# Patient Record
Sex: FEMALE | Race: WHITE | NOT HISPANIC OR LATINO | Employment: OTHER | URBAN - METROPOLITAN AREA
[De-identification: names, ages, dates, MRNs, and addresses within clinical notes are randomized per-mention and may not be internally consistent; named-entity substitution may affect disease eponyms.]

---

## 2018-06-30 ENCOUNTER — HOSPITAL ENCOUNTER (EMERGENCY)
Facility: HOSPITAL | Age: 73
Discharge: HOME | End: 2018-07-01
Attending: EMERGENCY MEDICINE
Payer: MEDICARE

## 2018-06-30 DIAGNOSIS — K62.3 RECTAL PROLAPSE: Primary | ICD-10-CM

## 2018-06-30 PROCEDURE — 99282 EMERGENCY DEPT VISIT SF MDM: CPT

## 2018-07-01 VITALS
HEART RATE: 75 BPM | WEIGHT: 97 LBS | HEIGHT: 62 IN | TEMPERATURE: 99 F | OXYGEN SATURATION: 100 % | SYSTOLIC BLOOD PRESSURE: 196 MMHG | DIASTOLIC BLOOD PRESSURE: 95 MMHG | BODY MASS INDEX: 17.85 KG/M2 | RESPIRATION RATE: 29 BRPM

## 2018-07-01 NOTE — ED PROVIDER NOTES
SCRIBE #1 NOTE: I, Marty Kee, am scribing for, and in the presence of, Jorge Hardy MD. I have scribed the entire note.      History      Chief Complaint   Patient presents with    Rectal Pain     pt reports rectal pain after bowel movement       Review of patient's allergies indicates:  No Known Allergies     HPI   HPI    7/1/2018, 1:39 AM   History obtained from the patient      History of Present Illness: Jolly Gleason is a 72 y.o. female patient who presents to the Emergency Department for an evaluation of rectal pain. Pt reports while using the restroom she strained herself until she suddenly felt a small mass on her bottom. Pt states she was able to push it back in but decided to come in for further evaluation. Symptoms are resolved. Exacerbated by nothing and relieved by nothing. No associated sxs. Patient denies any fever, chills, abd pain, N/V, rectal pain/bleeding, dysuria, hematuria, and all other sxs at this time. No further complaints or concerns at this time.     Arrival mode: Personal vehicle    PCP: Jyotsna St DNP       Past Medical History:  Past Medical History:   Diagnosis Date    Anxiety     Hypertension     Migraine headache     Stroke        Past Surgical History:  Past Surgical History:   Procedure Laterality Date    HYSTERECTOMY      MANDIBLE SURGERY      SINUS SURGERY      TONSILLECTOMY           Family History:  Family History   Problem Relation Age of Onset    Diabetes Mother     Heart disease Mother     Hypertension Mother     Stroke Mother     Cancer Father     Diabetes Daughter     Cancer Maternal Aunt     Diabetes Maternal Grandmother     Hypertension Maternal Grandmother        Social History:  Social History     Social History Main Topics    Smoking status: Current Every Day Smoker    Smokeless tobacco: Not on file    Alcohol use No    Drug use: No    Sexual activity: Not on file       ROS   Review of Systems   Constitutional: Negative for  chills and fever.   HENT: Negative for congestion and sore throat.    Respiratory: Negative for cough and shortness of breath.    Cardiovascular: Negative for chest pain.   Gastrointestinal: Positive for rectal pain (Resolved). Negative for abdominal pain, anal bleeding, blood in stool, constipation, diarrhea, nausea and vomiting.   Genitourinary: Negative for dysuria and hematuria.   Musculoskeletal: Negative for back pain.   Skin: Negative for rash.   Neurological: Negative for dizziness, weakness, light-headedness and headaches.   Hematological: Does not bruise/bleed easily.     Physical Exam      Initial Vitals [06/30/18 2222]   BP Pulse Resp Temp SpO2   (!) 150/84 94 20 98.7 °F (37.1 °C) 95 %      MAP       --          Physical Exam  Nursing Notes and Vital Signs Reviewed.  Constitutional: Patient is in no acute distress. Well-developed and well-nourished.  Head: Atraumatic. Normocephalic.  Eyes: PERRL. EOM intact. Conjunctivae are not pale. No scleral icterus.  ENT: Mucous membranes are moist. Oropharynx is clear and symmetric.    Neck: Supple. Full ROM. No lymphadenopathy.  Cardiovascular: Regular rate. Regular rhythm. No murmurs, rubs, or gallops. Distal pulses are 2+ and symmetric.  Pulmonary/Chest: No respiratory distress. Clear to auscultation bilaterally. No wheezing or rales.  Abdominal: Soft and non-distended.  There is no tenderness.  No rebound, guarding, or rigidity. Good bowel sounds.  Genitourinary: No CVA tenderness  Rectal:  No tenderness.  No masses. A few hemorrhoids.  Normal sphincter tone.    Musculoskeletal: Moves all extremities. No obvious deformities. No edema. No calf tenderness.  Skin: Warm and dry.  Neurological:  Alert, awake, and appropriate.  Normal speech.  No acute focal neurological deficits are appreciated.  Psychiatric: Normal affect. Good eye contact. Appropriate in content.    ED Course    Procedures  ED Vital Signs:  Vitals:    06/30/18 2222 07/01/18 0131   BP: (!) 150/84  "(!) 196/95   Pulse: 94 75   Resp: 20 (!) 29   Temp: 98.7 °F (37.1 °C)    TempSrc: Oral    SpO2: 95% 100%   Weight: 44 kg (97 lb)    Height: 5' 2" (1.575 m)               The Emergency Provider reviewed the vital signs and test results, which are outlined above.    ED Discussion     1:39 AM: Reassessed pt at this time. Rectal exam is unremarkable.  Sx's likely secondary to a prolapsed rectum.  Pt counseled that if it occurs again she is to attempt to reduce it back in place with gentle pressure and if she is unable to do so then return to the nearest emergency room.  Pt also counseled to F/U with GI outpatient as well.  Discussed pt dx and plan of tx. Gave pt all f/u and return to the ED instructions. All questions and concerns were addressed at this time. Pt expresses understanding of information and instructions, and is comfortable with plan to discharge. Pt is stable for discharge.    I discussed with patient and/or family/caretaker that evaluation in the ED does not suggest any emergent or life threatening medical conditions requiring immediate intervention beyond what was provided in the ED, and I believe patient is safe for discharge.  Regardless, an unremarkable evaluation in the ED does not preclude the development or presence of a serious of life threatening condition. As such, patient was instructed to return immediately for any worsening or change in current symptoms.      ED Medication(s):  Medications - No data to display    Discharge Medication List as of 7/1/2018  1:40 AM          Follow-up Information     Jyotsna St DNP. Schedule an appointment as soon as possible for a visit in 3 days.    Specialty:  Family Medicine  Why:  to follow-up on today's visit and seek referral to general surgery if the rectal prolapse becomes recurrent  Contact information:  [address] [facility]  [place] [id number] [phone]             Go to  [facility] .    Specialty:  Emergency Medicine  Why:  As needed, If symptoms worsen  Contact information:  [address] [facility] [address]  [facility] [address] [phone]                   Medical Decision Making              Scribe Attestation:   Scribe #1: I performed the above scribed service and the documentation accurately describes the services I performed. I attest to the accuracy of the note.    Attending:   Physician Attestation Statement for Scribe #1: I, Jorge Hardy MD, personally performed the services described in this documentation, as scribed by Marty Kee, in my presence, and it is both accurate and complete.          Clinical Impression       ICD-10-CM ICD-9-CM   1. Rectal prolapse K62.3 569.1       Disposition:   Disposition: Discharged  Condition: Stable         Jorge Hardy MD  07/04/18 1640

## 2018-07-03 ENCOUNTER — HOSPITAL ENCOUNTER (EMERGENCY)
Facility: HOSPITAL | Age: 73
Discharge: HOME | End: 2018-07-04
Attending: EMERGENCY MEDICINE
Payer: MEDICARE

## 2018-07-03 DIAGNOSIS — R10.9 ABDOMINAL PAIN, UNSPECIFIED ABDOMINAL LOCATION: Primary | ICD-10-CM

## 2018-07-03 LAB
ALBUMIN SERPL BCP-MCNC: 3.6 G/DL
ALP SERPL-CCNC: 56 U/L
ALT SERPL W/O P-5'-P-CCNC: 8 U/L
ANION GAP SERPL CALC-SCNC: 9 MMOL/L
AST SERPL-CCNC: 18 U/L
BASOPHILS # BLD AUTO: 0.01 K/UL
BASOPHILS NFR BLD: 0.2 %
BILIRUB SERPL-MCNC: 0.3 MG/DL
BUN SERPL-MCNC: 14 MG/DL
CALCIUM SERPL-MCNC: 8.8 MG/DL
CHLORIDE SERPL-SCNC: 103 MMOL/L
CO2 SERPL-SCNC: 22 MMOL/L
CREAT SERPL-MCNC: 1 MG/DL
DIFFERENTIAL METHOD: ABNORMAL
EOSINOPHIL # BLD AUTO: 0.1 K/UL
EOSINOPHIL NFR BLD: 2 %
ERYTHROCYTE [DISTWIDTH] IN BLOOD BY AUTOMATED COUNT: 13.6 %
EST. GFR  (AFRICAN AMERICAN): >60 ML/MIN/1.73 M^2
EST. GFR  (NON AFRICAN AMERICAN): 57 ML/MIN/1.73 M^2
GLUCOSE SERPL-MCNC: 97 MG/DL
HCT VFR BLD AUTO: 35.7 %
HGB BLD-MCNC: 12.3 G/DL
LIPASE SERPL-CCNC: 11 U/L
LYMPHOCYTES # BLD AUTO: 2 K/UL
LYMPHOCYTES NFR BLD: 30.3 %
MCH RBC QN AUTO: 33.6 PG
MCHC RBC AUTO-ENTMCNC: 34.5 G/DL
MCV RBC AUTO: 98 FL
MONOCYTES # BLD AUTO: 0.6 K/UL
MONOCYTES NFR BLD: 8.4 %
NEUTROPHILS # BLD AUTO: 3.9 K/UL
NEUTROPHILS NFR BLD: 59.1 %
PLATELET # BLD AUTO: 167 K/UL
PMV BLD AUTO: 9.4 FL
POTASSIUM SERPL-SCNC: 4.2 MMOL/L
PROT SERPL-MCNC: 5.8 G/DL
RBC # BLD AUTO: 3.66 M/UL
SODIUM SERPL-SCNC: 134 MMOL/L
WBC # BLD AUTO: 6.51 K/UL

## 2018-07-03 PROCEDURE — 80053 COMPREHEN METABOLIC PANEL: CPT

## 2018-07-03 PROCEDURE — 99284 EMERGENCY DEPT VISIT MOD MDM: CPT | Mod: 25

## 2018-07-03 PROCEDURE — 85025 COMPLETE CBC W/AUTO DIFF WBC: CPT

## 2018-07-03 PROCEDURE — 25500020 PHARM REV CODE 255: Performed by: EMERGENCY MEDICINE

## 2018-07-03 PROCEDURE — 83690 ASSAY OF LIPASE: CPT

## 2018-07-03 PROCEDURE — 96374 THER/PROPH/DIAG INJ IV PUSH: CPT

## 2018-07-03 RX ORDER — ACETAMINOPHEN 10 MG/ML
15 INJECTION, SOLUTION INTRAVENOUS ONCE
Status: COMPLETED | OUTPATIENT
Start: 2018-07-04 | End: 2018-07-04

## 2018-07-03 RX ADMIN — IOHEXOL 30 ML: 350 INJECTION, SOLUTION INTRAVENOUS at 11:07

## 2018-07-04 VITALS
TEMPERATURE: 99 F | HEIGHT: 62 IN | OXYGEN SATURATION: 95 % | WEIGHT: 96.13 LBS | RESPIRATION RATE: 18 BRPM | BODY MASS INDEX: 17.69 KG/M2 | HEART RATE: 69 BPM | SYSTOLIC BLOOD PRESSURE: 138 MMHG | DIASTOLIC BLOOD PRESSURE: 67 MMHG

## 2018-07-04 LAB
BILIRUB UR QL STRIP: NEGATIVE
CLARITY UR: CLEAR
COLOR UR: YELLOW
GLUCOSE UR QL STRIP: NEGATIVE
HGB UR QL STRIP: NEGATIVE
KETONES UR QL STRIP: NEGATIVE
LEUKOCYTE ESTERASE UR QL STRIP: NEGATIVE
NITRITE UR QL STRIP: NEGATIVE
PH UR STRIP: 7 [PH] (ref 5–8)
PROT UR QL STRIP: NEGATIVE
SP GR UR STRIP: 1.01 (ref 1–1.03)
URN SPEC COLLECT METH UR: NORMAL
UROBILINOGEN UR STRIP-ACNC: NEGATIVE EU/DL

## 2018-07-04 PROCEDURE — 63600175 PHARM REV CODE 636 W HCPCS: Performed by: EMERGENCY MEDICINE

## 2018-07-04 PROCEDURE — 81003 URINALYSIS AUTO W/O SCOPE: CPT

## 2018-07-04 RX ADMIN — ACETAMINOPHEN 660 MG: 10 INJECTION, SOLUTION INTRAVENOUS at 12:07

## 2018-07-04 NOTE — ED PROVIDER NOTES
SCRIBE #1 NOTE: I, Marty Kee, am scribing for, and in the presence of, Javier Mahmood MD. I have scribed the entire note.      History      Chief Complaint   Patient presents with    Pelvic Pain     c/o pelvic pain and swelling to lower abdomen. Pt recently seen here for rectal prolaps       Review of patient's allergies indicates:   Allergen Reactions    Lyrica [pregabalin]      syncope        HPI   HPI    7/3/2018, 10:44 PM   History obtained from the patient      History of Present Illness: Jolly Gleason is a 72 y.o. female patient who presents to the Emergency Department for an evaluation of pelvic pain which onset gradually a few days ago. Pt was reportedly seen in this ED for an evaluation of a prolapsed rectum x2 days ago. Symptoms are constant and moderate in severity. Exacerbated by palpation and relieved by nothing. Associated sxs include abdominal distention. Patient denies any fever, chills, N/V/D, vaginal bleeding/ discharge, HA, weakness/numbness, dizziness/lightheadedness, and all other sxs at this time. Pt denies tx PTA. No further complaints or concerns at this time.         Arrival mode: Personal vehicle    PCP: Jyotsna St DNP       Past Medical History:  Past Medical History:   Diagnosis Date    Anxiety     Hypertension     Migraine headache     Stroke        Past Surgical History:  Past Surgical History:   Procedure Laterality Date    HYSTERECTOMY      MANDIBLE SURGERY      SINUS SURGERY      TONSILLECTOMY           Family History:  Family History   Problem Relation Age of Onset    Diabetes Mother     Heart disease Mother     Hypertension Mother     Stroke Mother     Cancer Father     Diabetes Daughter     Cancer Maternal Aunt     Diabetes Maternal Grandmother     Hypertension Maternal Grandmother        Social History:  Social History     Social History Main Topics    Smoking status: Current Every Day Smoker    Smokeless tobacco: Unknown    Alcohol use No     Drug use: No    Sexual activity: Unknown       ROS   Review of Systems   Constitutional: Negative for appetite change, chills and fever.   HENT: Negative for congestion, sore throat and trouble swallowing.    Respiratory: Negative for cough and shortness of breath.    Cardiovascular: Negative for chest pain and leg swelling.   Gastrointestinal: Positive for abdominal distention and abdominal pain (Lower abdomen). Negative for blood in stool, constipation, diarrhea, nausea and vomiting.   Genitourinary: Positive for pelvic pain. Negative for dysuria, frequency, hematuria, urgency, vaginal bleeding and vaginal discharge.   Musculoskeletal: Negative for back pain and neck pain.   Skin: Negative for rash.   Neurological: Negative for dizziness, weakness, light-headedness and headaches.   Hematological: Does not bruise/bleed easily.     Physical Exam      Initial Vitals   BP Pulse Resp Temp SpO2   07/03/18 2218 07/03/18 2218 07/03/18 2218 07/03/18 2218 07/04/18 0026   (!) 150/76 72 18 98.6 °F (37 °C) 100 %      MAP       --                 Physical Exam  Nursing Notes and Vital Signs Reviewed.  Constitutional: Patient is in no acute distress.   Head: Atraumatic. Normocephalic.  Eyes: PERRL. EOM intact. Conjunctivae are not pale. No scleral icterus.  ENT: Mucous membranes are moist. Oropharynx is clear and symmetric.    Neck: Supple. Full ROM. No lymphadenopathy.  Cardiovascular: Regular rate. Regular rhythm. Heart murmur noted to right intercostal space. No rubs or gallops. Distal pulses are 2+ and symmetric.  Pulmonary/Chest: No respiratory distress. Clear to auscultation bilaterally. No wheezing or rales.  Abdominal: Soft and non-distended.  LLQ tenderness.  No rebound, guarding, or rigidity. Good bowel sounds.  Genitourinary: No CVA tenderness  Rectal:  No tenderness.  No masses.  No hemorrhoids.  Normal sphincter tone.  Musculoskeletal: Moves all extremities. No obvious deformities. No edema. No calf  "tenderness.  Skin: Warm and dry.  Neurological:  Alert, awake, and appropriate.  Normal speech.  No acute focal neurological deficits are appreciated.  Psychiatric: Normal affect. Good eye contact. Appropriate in content.    ED Course    Procedures  ED Vital Signs:  Vitals:    07/03/18 2218 07/04/18 0026 07/04/18 0100 07/04/18 0202   BP: (!) 150/76 (!) 153/67 125/70 138/67   Pulse: 72 (!) 58 65 69   Resp: 18      Temp: 98.6 °F (37 °C)      TempSrc: Oral      SpO2:  100% 98% 95%   Weight: 43.6 kg (96 lb 2 oz)      Height: 5' 2" (1.575 m)          Abnormal Lab Results:  Labs Reviewed   CBC W/ AUTO DIFFERENTIAL - Abnormal; Notable for the following:        Result Value    RBC 3.66 (*)     Hematocrit 35.7 (*)     MCH 33.6 (*)     All other components within normal limits   COMPREHENSIVE METABOLIC PANEL - Abnormal; Notable for the following:     Sodium 134 (*)     CO2 22 (*)     Total Protein 5.8 (*)     ALT 8 (*)     eGFR if non  57 (*)     All other components within normal limits   LIPASE   URINALYSIS        All Lab Results:  Results for orders placed or performed during the hospital encounter of 07/03/18   CBC W/ AUTO DIFFERENTIAL   Result Value Ref Range    WBC 6.51 3.90 - 12.70 K/uL    RBC 3.66 (L) 4.00 - 5.40 M/uL    Hemoglobin 12.3 12.0 - 16.0 g/dL    Hematocrit 35.7 (L) 37.0 - 48.5 %    MCV 98 82 - 98 fL    MCH 33.6 (H) 27.0 - 31.0 pg    MCHC 34.5 32.0 - 36.0 g/dL    RDW 13.6 11.5 - 14.5 %    Platelets 167 150 - 350 K/uL    MPV 9.4 9.2 - 12.9 fL    Gran # (ANC) 3.9 1.8 - 7.7 K/uL    Lymph # 2.0 1.0 - 4.8 K/uL    Mono # 0.6 0.3 - 1.0 K/uL    Eos # 0.1 0.0 - 0.5 K/uL    Baso # 0.01 0.00 - 0.20 K/uL    Gran% 59.1 38.0 - 73.0 %    Lymph% 30.3 18.0 - 48.0 %    Mono% 8.4 4.0 - 15.0 %    Eosinophil% 2.0 0.0 - 8.0 %    Basophil% 0.2 0.0 - 1.9 %    Differential Method Automated    Comp. Metabolic Panel   Result Value Ref Range    Sodium 134 (L) 136 - 145 mmol/L    Potassium 4.2 3.5 - 5.1 mmol/L    " Chloride 103 95 - 110 mmol/L    CO2 22 (L) 23 - 29 mmol/L    Glucose 97 70 - 110 mg/dL    BUN, Bld 14 8 - 23 mg/dL    Creatinine 1.0 0.5 - 1.4 mg/dL    Calcium 8.8 8.7 - 10.5 mg/dL    Total Protein 5.8 (L) 6.0 - 8.4 g/dL    Albumin 3.6 3.5 - 5.2 g/dL    Total Bilirubin 0.3 0.1 - 1.0 mg/dL    Alkaline Phosphatase 56 55 - 135 U/L    AST 18 10 - 40 U/L    ALT 8 (L) 10 - 44 U/L    Anion Gap 9 8 - 16 mmol/L    eGFR if African American >60 >60 mL/min/1.73 m^2    eGFR if non African American 57 (A) >60 mL/min/1.73 m^2   Lipase   Result Value Ref Range    Lipase 11 4 - 60 U/L   Urinalysis - Clean Catch   Result Value Ref Range    Specimen UA Urine, Clean Catch     Color, UA Yellow Yellow, Straw, Oanh    Appearance, UA Clear Clear    pH, UA 7.0 5.0 - 8.0    Specific Gravity, UA 1.010 1.005 - 1.030    Protein, UA Negative Negative    Glucose, UA Negative Negative    Ketones, UA Negative Negative    Bilirubin (UA) Negative Negative    Occult Blood UA Negative Negative    Nitrite, UA Negative Negative    Urobilinogen, UA Negative <2.0 EU/dL    Leukocytes, UA Negative Negative            Interpreted by virtual imaging.  Study: CT Abdomen and Pelvis w/o IV contrast  Findings: Per virtual imaging, No acute findings.       The Emergency Provider reviewed the vital signs and test results, which are outlined above.    ED Discussion     2:24 AM: Reassessed pt at this time. Pt is awake, alert, and in NAD. Pt states her condition has improved at this time. Discussed with pt all pertinent ED information and results. Discussed pt dx and plan of tx. Gave pt all f/u and return to the ED instructions. All questions and concerns were addressed at this time. Pt expresses understanding of information and instructions, and is comfortable with plan to discharge. Pt is stable for discharge.    I discussed with patient and/or family/caretaker that evaluation in the ED does not suggest any emergent or life threatening medical conditions requiring  immediate intervention beyond what was provided in the ED, and I believe patient is safe for discharge.  Regardless, an unremarkable evaluation in the ED does not preclude the development or presence of a serious of life threatening condition. As such, patient was instructed to return immediately for any worsening or change in current symptoms.      ED Medication(s):  Medications   acetaminophen (10 mg/mL) injection 660 mg (0 mg/kg × 44 kg Intravenous Stopped 7/4/18 0034)   omnipaque 350 iohexol 30 mL (30 mLs Oral Given 7/3/18 5200)       Discharge Medication List as of 7/4/2018  2:18 AM          Follow-up Information     Jyotsna St DNP In 2 days.    Specialty:  Family Medicine  Contact information:  [address] [facility]  [facility] [address] [phone]             [facility]    Specialty:  Emergency Medicine  Why:  As needed, If symptoms worsen  Contact information:  [address] [facility] [address] [phone]                   Medical Decision Making              Scribe Attestation:   Scribe #1: I performed the above scribed service and the documentation accurately describes the services I performed. I attest to the accuracy of the note.    Attending:   Physician Attestation Statement for Scribe #1: I, Javier Mahmood MD, personally performed the services described in this documentation, as scribed by Marty Kee, in my presence, and it is both accurate and complete.          Clinical Impression       ICD-10-CM ICD-9-CM   1. Abdominal pain, unspecified abdominal location R10.9 789.00       Disposition:   Disposition: Discharged  Condition: Stable         Javier Mahmood MD  07/04/18 0522

## 2018-07-05 ENCOUNTER — HOSPITAL ENCOUNTER (EMERGENCY)
Facility: HOSPITAL | Age: 73
Discharge: HOME | End: 2018-07-05
Attending: EMERGENCY MEDICINE
Payer: MEDICARE

## 2018-07-05 VITALS
OXYGEN SATURATION: 95 % | HEIGHT: 62 IN | TEMPERATURE: 98 F | DIASTOLIC BLOOD PRESSURE: 96 MMHG | RESPIRATION RATE: 20 BRPM | BODY MASS INDEX: 17.21 KG/M2 | HEART RATE: 82 BPM | WEIGHT: 93.5 LBS | SYSTOLIC BLOOD PRESSURE: 165 MMHG

## 2018-07-05 DIAGNOSIS — K62.3 RECTAL PROLAPSE: Primary | ICD-10-CM

## 2018-07-05 PROCEDURE — 99283 EMERGENCY DEPT VISIT LOW MDM: CPT

## 2018-07-05 NOTE — ED NOTES
Pt c/o this morning of rectal prolapse that self corrected but happen again. Upon exam by Dr Mullen, prolaspe self corrected.

## 2018-07-05 NOTE — ED PROVIDER NOTES
"SCRIBE #1 NOTE: I, Rox Mahmood, am scribing for, and in the presence of, Mayank Mullen MD. I have scribed the entire note.      History      Chief Complaint   Patient presents with    Rectal prolapse     Pt states, "I am having rectal prolapse again."       Review of patient's allergies indicates:   Allergen Reactions    Lyrica [pregabalin]      syncope        HPI   HPI    7/5/2018, 12:04 PM   History obtained from the patient      History of Present Illness: Jolly Gleason is a 72 y.o. female patient who presents to the Emergency Department for rectal prolapse which onset this AM. Symptoms are constant and moderate in severity. No mitigating or exacerbating factors reported. Associated sxs include rectal pain. Patient denies any fever, chills, N/V/D, abd pain, hematochezia, and all other sxs at this time. Prior Tx includes stool softeners. No further complaints or concerns at this time.       Arrival mode: Personal vehicle     PCP: Jyotsna St DNP       Past Medical History:  Past Medical History:   Diagnosis Date    Anxiety     Hypertension     Migraine headache     Rectal prolapse     Stroke        Past Surgical History:  Past Surgical History:   Procedure Laterality Date    HYSTERECTOMY      MANDIBLE SURGERY      SINUS SURGERY      TONSILLECTOMY           Family History:  Family History   Problem Relation Age of Onset    Diabetes Mother     Heart disease Mother     Hypertension Mother     Stroke Mother     Cancer Father     Diabetes Daughter     Cancer Maternal Aunt     Diabetes Maternal Grandmother     Hypertension Maternal Grandmother        Social History:  Social History     Social History Main Topics    Smoking status: Current Every Day Smoker    Smokeless tobacco: Never Used    Alcohol use No    Drug use: No    Sexual activity: Unknown       ROS   Review of Systems   Constitutional: Negative for chills, diaphoresis and fever.   HENT: Negative for congestion, " "rhinorrhea and sore throat.    Respiratory: Negative for cough and shortness of breath.    Cardiovascular: Negative for chest pain.   Gastrointestinal: Positive for rectal pain. Negative for abdominal pain, diarrhea, nausea and vomiting.        (+) rectal prolapse   Genitourinary: Negative for dysuria, frequency and hematuria.   Musculoskeletal: Negative for back pain and neck pain.   Skin: Negative for rash.   Neurological: Negative for dizziness, weakness, numbness and headaches.   Hematological: Does not bruise/bleed easily.     Physical Exam      Initial Vitals [07/05/18 1146]   BP Pulse Resp Temp SpO2   (!) 165/96 82 20 97.8 °F (36.6 °C) 95 %      MAP       --          Physical Exam  Nursing Notes and Vital Signs Reviewed.  Constitutional: Patient is in no acute distress. Elderly and frail.  Head: Atraumatic. Normocephalic.  Eyes: PERRL. EOM intact. Conjunctivae are not pale. No scleral icterus.  ENT: Mucous membranes are moist. Oropharynx is clear and symmetric.    Neck: Supple. Full ROM. No lymphadenopathy.  Cardiovascular: Regular rate. Regular rhythm. No murmurs, rubs, or gallops. Distal pulses are 2+ and symmetric.  Pulmonary/Chest: No respiratory distress. Clear to auscultation bilaterally. No wheezing or rales.  Abdominal: Soft and non-distended.  There is no tenderness.  No rebound, guarding, or rigidity.   Musculoskeletal: Moves all extremities. No obvious deformities. No edema.  Rectal:  No tenderness.  No masses.  No hemorrhoids.  Normal sphincter tone.  Prolapse has resolved.  Skin: Warm and dry.  Neurological:  Alert, awake, and appropriate.  Normal speech.  No acute focal neurological deficits are appreciated.  Psychiatric: Normal affect. Good eye contact. Appropriate in content.    ED Course    Procedures  ED Vital Signs:  Vitals:    07/05/18 1146   BP: (!) 165/96   Pulse: 82   Resp: 20   Temp: 97.8 °F (36.6 °C)   TempSrc: Oral   SpO2: 95%   Weight: 42.4 kg (93 lb 7.6 oz)   Height: 5' 2" (1.575 " m)            The Emergency Provider reviewed the vital signs and test results, which are outlined above.    ED Discussion     12:07 PM: Reassessed pt at this time.  Pt states her condition has improved at this time. Discussed with pt all pertinent ED information and results. Discussed pt dx and plan of tx. Gave pt all f/u and return to the ED instructions. All questions and concerns were addressed at this time. Pt expresses understanding of information and instructions, and is comfortable with plan to discharge. Pt is stable for discharge.    I discussed with patient and/or family/caretaker that evaluation in the ED does not suggest any emergent or life threatening medical conditions requiring immediate intervention beyond what was provided in the ED, and I believe patient is safe for discharge.  Regardless, an unremarkable evaluation in the ED does not preclude the development or presence of a serious of life threatening condition. As such, patient was instructed to return immediately for any worsening or change in current symptoms.    ED Medication(s):  Medications - No data to display    New Prescriptions    No medications on file       Follow-up Information     general surgery.    Contact information:  [phone]                   Medical Decision Making              Scribe Attestation:   Scribe #1: I performed the above scribed service and the documentation accurately describes the services I performed. I attest to the accuracy of the note.    Attending:   Physician Attestation Statement for Scribe #1: I, Mayank Mullen MD, personally performed the services described in this documentation, as scribed by Rox Mahmood, in my presence, and it is both accurate and complete.          Clinical Impression       ICD-10-CM ICD-9-CM   1. Rectal prolapse K62.3 569.1       Disposition:   Disposition: Discharged  Condition: Stable         Mayank Mullen MD  07/05/18 1213

## 2019-01-01 ENCOUNTER — OFFICE VISIT (OUTPATIENT)
Dept: VASCULAR SURGERY | Facility: CLINIC | Age: 74
End: 2019-01-01
Payer: MEDICARE

## 2019-01-01 ENCOUNTER — HOSPITAL ENCOUNTER (OUTPATIENT)
Dept: RADIOLOGY | Facility: HOSPITAL | Age: 74
Discharge: HOME | End: 2019-09-26
Attending: THORACIC SURGERY (CARDIOTHORACIC VASCULAR SURGERY)
Payer: MEDICARE

## 2019-01-01 VITALS
SYSTOLIC BLOOD PRESSURE: 123 MMHG | WEIGHT: 112 LBS | BODY MASS INDEX: 20.61 KG/M2 | HEIGHT: 62 IN | DIASTOLIC BLOOD PRESSURE: 74 MMHG | HEART RATE: 78 BPM

## 2019-01-01 DIAGNOSIS — I73.9 PERIPHERAL VASCULAR DISEASE, UNSPECIFIED: Primary | ICD-10-CM

## 2019-01-01 DIAGNOSIS — I73.9 PERIPHERAL VASCULAR DISEASE, UNSPECIFIED: ICD-10-CM

## 2019-01-01 DIAGNOSIS — Z79.01 CURRENT USE OF LONG TERM ANTICOAGULATION: ICD-10-CM

## 2019-01-01 DIAGNOSIS — I73.9 PAD (PERIPHERAL ARTERY DISEASE): Primary | ICD-10-CM

## 2019-01-01 DIAGNOSIS — L97.411 SKIN ULCER OF RIGHT HEEL, LIMITED TO BREAKDOWN OF SKIN: ICD-10-CM

## 2019-01-01 PROCEDURE — 1101F PR PT FALLS ASSESS DOC 0-1 FALLS W/OUT INJ PAST YR: ICD-10-PCS | Mod: CPTII,S$GLB,, | Performed by: THORACIC SURGERY (CARDIOTHORACIC VASCULAR SURGERY)

## 2019-01-01 PROCEDURE — 93926 US LOWER EXTREMITY ARTERIES RIGHT: ICD-10-PCS | Mod: 26,RT,, | Performed by: RADIOLOGY

## 2019-01-01 PROCEDURE — 93922 UPR/L XTREMITY ART 2 LEVELS: CPT | Mod: TC,PO

## 2019-01-01 PROCEDURE — 93922 US ANKLE/BRACH INDICES EXT LTD W/O STR 1-2 LEVELS: ICD-10-PCS | Mod: 26,,, | Performed by: RADIOLOGY

## 2019-01-01 PROCEDURE — 99203 PR OFFICE/OUTPT VISIT, NEW, LEVL III, 30-44 MIN: ICD-10-PCS | Mod: S$GLB,,, | Performed by: THORACIC SURGERY (CARDIOTHORACIC VASCULAR SURGERY)

## 2019-01-01 PROCEDURE — 99999 PR PBB SHADOW E&M-EST. PATIENT-LVL II: ICD-10-PCS | Mod: PBBFAC,,, | Performed by: THORACIC SURGERY (CARDIOTHORACIC VASCULAR SURGERY)

## 2019-01-01 PROCEDURE — 93926 LOWER EXTREMITY STUDY: CPT | Mod: TC,PO,RT

## 2019-01-01 PROCEDURE — 99203 OFFICE O/P NEW LOW 30 MIN: CPT | Mod: S$GLB,,, | Performed by: THORACIC SURGERY (CARDIOTHORACIC VASCULAR SURGERY)

## 2019-01-01 PROCEDURE — 1101F PT FALLS ASSESS-DOCD LE1/YR: CPT | Mod: CPTII,S$GLB,, | Performed by: THORACIC SURGERY (CARDIOTHORACIC VASCULAR SURGERY)

## 2019-01-01 PROCEDURE — 99999 PR PBB SHADOW E&M-EST. PATIENT-LVL II: CPT | Mod: PBBFAC,,, | Performed by: THORACIC SURGERY (CARDIOTHORACIC VASCULAR SURGERY)

## 2019-01-01 PROCEDURE — 93926 LOWER EXTREMITY STUDY: CPT | Mod: 26,RT,, | Performed by: RADIOLOGY

## 2019-01-01 PROCEDURE — 93922 UPR/L XTREMITY ART 2 LEVELS: CPT | Mod: 26,,, | Performed by: RADIOLOGY

## 2019-01-01 RX ORDER — FERROUS SULFATE 324(65)MG
65 TABLET, DELAYED RELEASE (ENTERIC COATED) ORAL
COMMUNITY
Start: 2019-01-01

## 2019-01-01 RX ORDER — VITAMIN E 268 MG
400 CAPSULE ORAL
COMMUNITY

## 2019-01-01 RX ORDER — TIZANIDINE 4 MG/1
TABLET ORAL
COMMUNITY
Start: 2019-01-01

## 2019-01-01 RX ORDER — ONDANSETRON 8 MG/1
TABLET, ORALLY DISINTEGRATING ORAL
COMMUNITY
Start: 2019-01-01

## 2019-01-01 RX ORDER — AMOXICILLIN 250 MG
1 CAPSULE ORAL DAILY PRN
COMMUNITY

## 2019-01-01 RX ORDER — NAPROXEN SODIUM 220 MG/1
81 TABLET, FILM COATED ORAL
COMMUNITY
Start: 2019-01-01 | End: 2020-03-20

## 2019-01-01 RX ORDER — MULTIVITAMIN
1 TABLET ORAL
COMMUNITY

## 2019-01-01 RX ORDER — CLOPIDOGREL BISULFATE 75 MG/1
TABLET ORAL
COMMUNITY
Start: 2019-01-01

## 2019-01-01 RX ORDER — SUMATRIPTAN SUCCINATE 100 MG/1
100 TABLET ORAL
COMMUNITY
Start: 2019-01-01 | End: 2020-01-24

## 2019-01-01 RX ORDER — LIDOCAINE HYDROCHLORIDE 10 MG/ML
1 INJECTION, SOLUTION EPIDURAL; INFILTRATION; INTRACAUDAL; PERINEURAL ONCE
Status: CANCELLED | OUTPATIENT
Start: 2019-01-01 | End: 2019-01-01

## 2019-01-01 RX ORDER — ASCORBIC ACID 500 MG
500 TABLET ORAL
COMMUNITY

## 2019-01-01 RX ORDER — MONTELUKAST SODIUM 10 MG/1
TABLET ORAL
COMMUNITY
Start: 2019-01-01

## 2019-01-01 RX ORDER — GABAPENTIN 100 MG/1
CAPSULE ORAL
COMMUNITY
Start: 2019-01-01

## 2019-01-01 RX ORDER — PANTOPRAZOLE SODIUM 40 MG/1
TABLET, DELAYED RELEASE ORAL
COMMUNITY
Start: 2019-01-01

## 2019-01-01 RX ORDER — PROPRANOLOL HYDROCHLORIDE 40 MG/1
TABLET ORAL
COMMUNITY
Start: 2019-01-01

## 2019-09-26 PROBLEM — I73.9 PAD (PERIPHERAL ARTERY DISEASE): Status: ACTIVE | Noted: 2019-01-01

## 2019-09-26 NOTE — PROGRESS NOTES
This patient was referred due to a nonhealing ulcer on the right heel.  She has a history of peripheral arterial disease and intervention in the right superficial femoral artery within the last year at an outside facility.  She has a history of smoking.  She has no other significant family or social history at this time.  Her problem list was reviewed.  Her medicines are a part of a epic record.  She has had previous orthopedic surgery and hysterectomy.  On physical exam she is elderly in a wheelchair and stays in a nursing home.  Her vital signs are stable.  Her pupils are equal and round and reactive to light.  Her neck is supple.  Her chest is clear to auscultation.  Her abdomen is benign.  Her femoral pulses are what diminished.  Pedal pulses are diminished bilaterally.  Her angiogram was done in an outside facility and the results noted.  The wound on the right heel was examined.  It is not infected.  However it is approximately 2 cm in greatest transverse dimension.  And according to the patient he had has not healed very well.  At this point and ultrasound of the right lower extremity will be obtained.  The results of this will be reviewed and recommendations made.

## 2019-10-14 PROBLEM — I73.9 PERIPHERAL VASCULAR DISEASE, UNSPECIFIED: Status: ACTIVE | Noted: 2019-01-01
